# Patient Record
Sex: FEMALE | Race: WHITE | NOT HISPANIC OR LATINO
[De-identification: names, ages, dates, MRNs, and addresses within clinical notes are randomized per-mention and may not be internally consistent; named-entity substitution may affect disease eponyms.]

---

## 2019-01-17 ENCOUNTER — TRANSCRIPTION ENCOUNTER (OUTPATIENT)
Age: 69
End: 2019-01-17

## 2019-03-31 ENCOUNTER — TRANSCRIPTION ENCOUNTER (OUTPATIENT)
Age: 69
End: 2019-03-31

## 2021-02-03 PROBLEM — Z00.00 ENCOUNTER FOR PREVENTIVE HEALTH EXAMINATION: Status: ACTIVE | Noted: 2021-02-03

## 2021-04-02 ENCOUNTER — APPOINTMENT (OUTPATIENT)
Dept: BREAST CENTER | Facility: CLINIC | Age: 71
End: 2021-04-02
Payer: MEDICARE

## 2021-04-02 VITALS
DIASTOLIC BLOOD PRESSURE: 95 MMHG | SYSTOLIC BLOOD PRESSURE: 168 MMHG | HEIGHT: 63 IN | WEIGHT: 120 LBS | BODY MASS INDEX: 21.26 KG/M2 | HEART RATE: 64 BPM | OXYGEN SATURATION: 98 % | TEMPERATURE: 97.6 F

## 2021-04-02 DIAGNOSIS — Z78.9 OTHER SPECIFIED HEALTH STATUS: ICD-10-CM

## 2021-04-02 DIAGNOSIS — N64.52 NIPPLE DISCHARGE: ICD-10-CM

## 2021-04-02 DIAGNOSIS — Z80.0 FAMILY HISTORY OF MALIGNANT NEOPLASM OF DIGESTIVE ORGANS: ICD-10-CM

## 2021-04-02 PROCEDURE — 99204 OFFICE O/P NEW MOD 45 MIN: CPT

## 2021-04-02 NOTE — HISTORY OF PRESENT ILLNESS
[FreeTextEntry1] : I had the pleasure of seeing RADHA CHEN  in the office today for a new breast evaluation secondary to left breast breast lump and clear/yellow left nipple discharge.\par \par Radha is a yury 71 yo who undergoes annual mammogram and sonogram.  Most recent US from 2020 demonstrated stable left breast cysts and mass.  She underwent MR breast in 2020 due to manually expressed clear left nipple discharge.  MR breast demonstrate 5 mm enhancing in the right breast with recommendation of biopsy.  She was scheduled to undergo right breast MR biopsy but they were unable to visualize enhancing mass and therefore recommended short term follow up MR breast.  She went back to her breast surgeon Dr Martinez Kendrick, who recommended a excisional biopsy.  Patient is here for a second opinion.\par \par She denies dominant breast mass, skin changes. She denies headaches, blurry vision, chest pain, SOB, abdominal pain, joint aches or difficult walking.\par \par  with 1st delivery at 36.  Menses began at age 14.\par She denies personal history of malignancy.\par Family history is significant for father diagnosed with colon cancer.\par \par 20  MRI brest with and without IV contrast bilateral  \par Impression:  Right breast 0.5 cm enhancing mass at the 6:0 position.  MR guided biopsy is recommended.  BIRADS 4\par \par 2020  MRI guided breast biopsy with and without IV contrast right\par Impression:  Previously seen 5 mm mass in the right breast at 6:00 was not identified on today's prebiopsy images.  Therefor biopsy was not performed.  Recommend 6 moth follow up MRI.  \par 7/10/2020  Breast ultrasound left complete\par Impression:  Stable benign appearing nodules.  Recommend six month follow up left breast ultrasound.  BIRADS 3 probably benign.\par \par We reviewed clinical exam and recent imaging.  Clear nipple discharge is manually expressed from the left nipple today.  There are no dominant masses on exam today.  MR breast from 2020 demonstrated a 5 mm enhancing in the right breast with recommendation of biopsy.  She was scheduled to undergo right breast MR biopsy but they were unable to visualize enhancing mass and therefore recommended short term follow up MR breast.  She went back to her breast surgeon Dr Martinez Kendrick, who recommended a excisional biopsy.  Patient is here for a second opinion.  Recommendation for labs, MR breast, and follow up after MR breast.  Patient wants to delay MR breast due to COVID vaccine placed yesterday and will undergo MR the 1 st week of May.\par \par She understands and agrees with plan.  All questions answered.\par \par \par

## 2021-04-02 NOTE — ASSESSMENT
[FreeTextEntry1] : 71 yo presents with left nipple clear discharge and abnormal MR breast.  MR breast from February 2020 demonstrated a 5 mm enhancing in the right breast with recommendation of biopsy.  She was scheduled to undergo right breast MR biopsy but they were unable to visualize enhancing mass and therefore recommended short term follow up MR breast.  She went back to her breast surgeon Dr Martinez Kendrick, who recommended a excisional biopsy.  Patient is here for a second opinion.  Recommendation for labs, MR breast, and follow up after MR breast.  Patient wants to delay MR breast due to COVID vaccine placed yesterday and will undergo MR the 1 st week of May.\par 1.  MR breast the 1 st week of MAy\par 2.  Follow up after MR breast via TEB\par 3.  Screening mammogram/sonogram 10/2021

## 2021-04-02 NOTE — PHYSICAL EXAM
[Normocephalic] : normocephalic [Atraumatic] : atraumatic [Supple] : supple [No Supraclavicular Adenopathy] : no supraclavicular adenopathy [Examined in the supine and seated position] : examined in the supine and seated position [No dominant masses] : no dominant masses in right breast  [No dominant masses] : no dominant masses left breast [No Nipple Retraction] : no left nipple retraction [No Nipple Discharge] : no right nipple discharge [Breast Abnormal Secretion Serous Fluid Left] : serous discharge [No Axillary Lymphadenopathy] : no left axillary lymphadenopathy [No Edema] : no edema [No Rashes] : no rashes [No Ulceration] : no ulceration [de-identified] : No supraclavicular or axillary adenopathy. No dominant masses, normal to palpation. Everted nipple without discharge. No skin changes.\par \par  [de-identified] : No supraclavicular or axillary adenopathy. No dominant masses, normal to palpation. Everted nipple without discharge. No skin changes.\par \par

## 2021-05-10 ENCOUNTER — APPOINTMENT (OUTPATIENT)
Dept: MRI IMAGING | Facility: CLINIC | Age: 71
End: 2021-05-10
Payer: MEDICARE

## 2021-05-10 ENCOUNTER — OUTPATIENT (OUTPATIENT)
Dept: OUTPATIENT SERVICES | Facility: HOSPITAL | Age: 71
LOS: 1 days | End: 2021-05-10
Payer: MEDICARE

## 2021-05-10 DIAGNOSIS — R92.8 OTHER ABNORMAL AND INCONCLUSIVE FINDINGS ON DIAGNOSTIC IMAGING OF BREAST: ICD-10-CM

## 2021-05-10 PROCEDURE — 77049 MRI BREAST C-+ W/CAD BI: CPT | Mod: 26,MG

## 2021-05-10 PROCEDURE — G1004: CPT

## 2021-05-10 PROCEDURE — C8908: CPT

## 2021-05-10 PROCEDURE — A9585: CPT

## 2021-05-10 PROCEDURE — C8937: CPT

## 2021-05-25 ENCOUNTER — APPOINTMENT (OUTPATIENT)
Dept: MRI IMAGING | Facility: CLINIC | Age: 71
End: 2021-05-25
Payer: MEDICARE

## 2021-05-25 ENCOUNTER — RESULT REVIEW (OUTPATIENT)
Age: 71
End: 2021-05-25

## 2021-05-25 ENCOUNTER — OUTPATIENT (OUTPATIENT)
Dept: OUTPATIENT SERVICES | Facility: HOSPITAL | Age: 71
LOS: 1 days | End: 2021-05-25
Payer: MEDICARE

## 2021-05-25 DIAGNOSIS — R92.8 OTHER ABNORMAL AND INCONCLUSIVE FINDINGS ON DIAGNOSTIC IMAGING OF BREAST: ICD-10-CM

## 2021-05-25 PROCEDURE — 88305 TISSUE EXAM BY PATHOLOGIST: CPT

## 2021-05-25 PROCEDURE — A9585: CPT

## 2021-05-25 PROCEDURE — 77065 DX MAMMO INCL CAD UNI: CPT

## 2021-05-25 PROCEDURE — 77065 DX MAMMO INCL CAD UNI: CPT | Mod: 26,LT

## 2021-05-25 PROCEDURE — 88305 TISSUE EXAM BY PATHOLOGIST: CPT | Mod: 26

## 2021-05-25 PROCEDURE — 19085 BX BREAST 1ST LESION MR IMAG: CPT | Mod: LT

## 2021-05-25 PROCEDURE — 19085 BX BREAST 1ST LESION MR IMAG: CPT

## 2021-05-25 PROCEDURE — A4648: CPT

## 2021-05-28 ENCOUNTER — NON-APPOINTMENT (OUTPATIENT)
Age: 71
End: 2021-05-28

## 2021-06-17 ENCOUNTER — APPOINTMENT (OUTPATIENT)
Dept: SURGERY | Facility: CLINIC | Age: 71
End: 2021-06-17
Payer: MEDICARE

## 2021-06-17 VITALS
DIASTOLIC BLOOD PRESSURE: 76 MMHG | HEART RATE: 76 BPM | BODY MASS INDEX: 21.62 KG/M2 | SYSTOLIC BLOOD PRESSURE: 136 MMHG | WEIGHT: 122 LBS | TEMPERATURE: 98 F | OXYGEN SATURATION: 98 % | HEIGHT: 63 IN

## 2021-06-17 DIAGNOSIS — Z12.39 ENCOUNTER FOR OTHER SCREENING FOR MALIGNANT NEOPLASM OF BREAST: ICD-10-CM

## 2021-06-17 PROCEDURE — 99213 OFFICE O/P EST LOW 20 MIN: CPT

## 2021-06-17 NOTE — HISTORY OF PRESENT ILLNESS
[FreeTextEntry1] : I had the pleasure of seeing RADHA CHEN  in the office today to review biopsy results of the left breast demonstrating a intraductal papilloma. \par \par Radha is a yury 69 yo who undergoes annual mammogram and sonogram.  Most recent US from 2020 demonstrated stable left breast cysts and mass.  She underwent MR breast in 2020 due to manually expressed clear left nipple discharge.  MR breast demonstrate 5 mm enhancing in the right breast with recommendation of biopsy.  She was scheduled to undergo right breast MR biopsy but they were unable to visualize enhancing mass and therefore recommended short term follow up MR breast.  She went back to her breast surgeon Dr Martinez Kendrick, who recommended a excisional biopsy.  Patient is here for a second opinion.\par \par She underwent short term follow up MR breast on 5/10/2021 demonstrating a 1 cm enhancement with recommendation of biopsy.  Biopsy performed on 2021 demonstrated a intraductal papilloma. \par \par She denies dominant breast mass, skin changes. She denies headaches, blurry vision, chest pain, SOB, abdominal pain, joint aches or difficult walking.\par \par  with 1st delivery at 36.  Menses began at age 14.\par She denies personal history of malignancy.\par Family history is significant for father diagnosed with colon cancer.\par \par 20  MRI brest with and without IV contrast bilateral  \par Impression:  Right breast 0.5 cm enhancing mass at the 6:0 position.  MR guided biopsy is recommended.  BIRADS 4\par \par 2020  MRI guided breast biopsy with and without IV contrast right\par Impression:  Previously seen 5 mm mass in the right breast at 6:00 was not identified on today's prebiopsy images.  Therefor biopsy was not performed.  Recommend 6 moth follow up MRI.  \par 7/10/2020  Breast ultrasound left complete\par Impression:  Stable benign appearing nodules.  Recommend six month follow up left breast ultrasound.  BIRADS 3 probably benign.\par \par MR breast 5/10/2021\par A 1.0 cm linear nonmass enhancement in the left retroareolar breast. Given history of nipple discharge, left breast MRI guided biopsy is recommended.\par No dominant suspicious enhancement in the right breast at this time. Numerous bilateral foci/moderate background enhancement. Recommend follow-up MRI evaluation in 6 months.\par RECOMMENDATION: MR guided biopsy. BI-RADS 4A -Suspicious Finding(s) -Low Suspicion for Malignancy\par \par We reviewed the findings on imaging and pathology.  Radha understands that intraductal papilloma is benign.  We discussed options of surgical localized excisional biopsy v continued observation. She understands that there is no evidence of atypia and that she may continue observation with clinical examination twice a year/annual screening mammogram + ultrasound.\par \par Radha understands that intraductal papilloma is benign however in 10-20% of cases abnormal or cancer cells is found at excisional biopsy.  We discussed the higher risk associated with atypia or size and she understands that based on the imaging and pathology, the findings on pathology are at lower risk for upstaging.\par Technical aspects of biopsy were discussed including UNRULY  and wire localization.  We also discussed the chance that the clip is not in the specimen and in that case, recommendation for 3-6 month follow up diagnostic mammogram.\par \par She understands and wants to proceed with clinical observation\par \par All questions were answered.

## 2021-06-17 NOTE — ASSESSMENT
[FreeTextEntry1] : 71 yo presented to the office with left nipple discharge and MR biopsy proven left breast intraductal papilloma ( 1 cm linear enhancement).    Plan for clinical observation.  She does not want to undergo surgery at this time.\par 1.  MR breast - short term follow up for numerous bilateral/moderated background enhancement 11/2021\par 2.  Mammogram/sonogram in 3 months\par 3.  Left breast tasha  excisional biopsy\par 4.  Follow up in 3 months

## 2021-06-17 NOTE — PHYSICAL EXAM
[Normocephalic] : normocephalic [Atraumatic] : atraumatic [Supple] : supple [No Supraclavicular Adenopathy] : no supraclavicular adenopathy [Examined in the supine and seated position] : examined in the supine and seated position [No dominant masses] : no dominant masses in right breast  [No dominant masses] : no dominant masses left breast [No Nipple Retraction] : no left nipple retraction [No Nipple Discharge] : no right nipple discharge [Breast Abnormal Secretion Serous Fluid Left] : serous discharge [No Axillary Lymphadenopathy] : no left axillary lymphadenopathy [No Edema] : no edema [No Rashes] : no rashes [No Ulceration] : no ulceration [de-identified] : No supraclavicular or axillary adenopathy. No dominant masses, normal to palpation. Everted nipple without discharge. No skin changes.\par \par  [de-identified] : No supraclavicular or axillary adenopathy. No dominant masses, normal to palpation. Everted nipple without discharge. No skin changes.\par \par

## 2021-09-20 ENCOUNTER — APPOINTMENT (OUTPATIENT)
Dept: ULTRASOUND IMAGING | Facility: CLINIC | Age: 71
End: 2021-09-20

## 2021-09-20 ENCOUNTER — APPOINTMENT (OUTPATIENT)
Dept: MAMMOGRAPHY | Facility: CLINIC | Age: 71
End: 2021-09-20

## 2021-09-23 ENCOUNTER — OUTPATIENT (OUTPATIENT)
Dept: OUTPATIENT SERVICES | Facility: HOSPITAL | Age: 71
LOS: 1 days | End: 2021-09-23
Payer: MEDICARE

## 2021-09-23 ENCOUNTER — APPOINTMENT (OUTPATIENT)
Dept: MAMMOGRAPHY | Facility: CLINIC | Age: 71
End: 2021-09-23
Payer: MEDICARE

## 2021-09-23 ENCOUNTER — RESULT REVIEW (OUTPATIENT)
Age: 71
End: 2021-09-23

## 2021-09-23 ENCOUNTER — APPOINTMENT (OUTPATIENT)
Dept: ULTRASOUND IMAGING | Facility: CLINIC | Age: 71
End: 2021-09-23
Payer: MEDICARE

## 2021-09-23 DIAGNOSIS — D24.2 BENIGN NEOPLASM OF LEFT BREAST: ICD-10-CM

## 2021-09-23 PROCEDURE — 76641 ULTRASOUND BREAST COMPLETE: CPT | Mod: 26,50

## 2021-09-23 PROCEDURE — 77066 DX MAMMO INCL CAD BI: CPT

## 2021-09-23 PROCEDURE — G0279: CPT | Mod: 26

## 2021-09-23 PROCEDURE — 77066 DX MAMMO INCL CAD BI: CPT | Mod: 26

## 2021-09-23 PROCEDURE — 76641 ULTRASOUND BREAST COMPLETE: CPT

## 2021-09-23 PROCEDURE — G0279: CPT

## 2021-09-30 ENCOUNTER — APPOINTMENT (OUTPATIENT)
Dept: SURGERY | Facility: CLINIC | Age: 71
End: 2021-09-30

## 2021-11-17 ENCOUNTER — APPOINTMENT (OUTPATIENT)
Dept: MRI IMAGING | Facility: CLINIC | Age: 71
End: 2021-11-17
Payer: MEDICARE

## 2021-11-17 ENCOUNTER — OUTPATIENT (OUTPATIENT)
Dept: OUTPATIENT SERVICES | Facility: HOSPITAL | Age: 71
LOS: 1 days | End: 2021-11-17
Payer: MEDICARE

## 2021-11-17 DIAGNOSIS — R92.8 OTHER ABNORMAL AND INCONCLUSIVE FINDINGS ON DIAGNOSTIC IMAGING OF BREAST: ICD-10-CM

## 2021-11-17 PROCEDURE — G1004: CPT

## 2021-11-17 PROCEDURE — C8908: CPT | Mod: MG

## 2021-11-17 PROCEDURE — C8937: CPT

## 2021-11-17 PROCEDURE — A9585: CPT

## 2021-11-17 PROCEDURE — 77049 MRI BREAST C-+ W/CAD BI: CPT | Mod: 26,MG

## 2022-03-28 ENCOUNTER — RESULT REVIEW (OUTPATIENT)
Age: 72
End: 2022-03-28

## 2022-03-28 ENCOUNTER — OUTPATIENT (OUTPATIENT)
Dept: OUTPATIENT SERVICES | Facility: HOSPITAL | Age: 72
LOS: 1 days | End: 2022-03-28

## 2022-03-28 ENCOUNTER — APPOINTMENT (OUTPATIENT)
Dept: ULTRASOUND IMAGING | Facility: CLINIC | Age: 72
End: 2022-03-28

## 2022-03-28 DIAGNOSIS — Z00.00 ENCOUNTER FOR GENERAL ADULT MEDICAL EXAMINATION WITHOUT ABNORMAL FINDINGS: ICD-10-CM

## 2022-03-31 ENCOUNTER — APPOINTMENT (OUTPATIENT)
Dept: SURGERY | Facility: CLINIC | Age: 72
End: 2022-03-31

## 2022-04-12 ENCOUNTER — APPOINTMENT (OUTPATIENT)
Dept: ULTRASOUND IMAGING | Facility: CLINIC | Age: 72
End: 2022-04-12
Payer: MEDICARE

## 2022-04-12 ENCOUNTER — RESULT REVIEW (OUTPATIENT)
Age: 72
End: 2022-04-12

## 2022-04-12 ENCOUNTER — OUTPATIENT (OUTPATIENT)
Dept: OUTPATIENT SERVICES | Facility: HOSPITAL | Age: 72
LOS: 1 days | End: 2022-04-12
Payer: MEDICARE

## 2022-04-12 DIAGNOSIS — R92.8 OTHER ABNORMAL AND INCONCLUSIVE FINDINGS ON DIAGNOSTIC IMAGING OF BREAST: ICD-10-CM

## 2022-04-12 PROCEDURE — 76642 ULTRASOUND BREAST LIMITED: CPT

## 2022-04-12 PROCEDURE — 76642 ULTRASOUND BREAST LIMITED: CPT | Mod: 26,LT

## 2022-04-19 ENCOUNTER — APPOINTMENT (OUTPATIENT)
Dept: BREAST CENTER | Facility: CLINIC | Age: 72
End: 2022-04-19
Payer: MEDICARE

## 2022-04-19 VITALS
BODY MASS INDEX: 21.27 KG/M2 | TEMPERATURE: 98.1 F | DIASTOLIC BLOOD PRESSURE: 92 MMHG | WEIGHT: 120.01 LBS | HEIGHT: 63 IN | OXYGEN SATURATION: 98 % | SYSTOLIC BLOOD PRESSURE: 148 MMHG | HEART RATE: 75 BPM

## 2022-04-19 DIAGNOSIS — D24.2 BENIGN NEOPLASM OF LEFT BREAST: ICD-10-CM

## 2022-04-19 DIAGNOSIS — R92.2 INCONCLUSIVE MAMMOGRAM: ICD-10-CM

## 2022-04-19 DIAGNOSIS — R92.8 OTHER ABNORMAL AND INCONCLUSIVE FINDINGS ON DIAGNOSTIC IMAGING OF BREAST: ICD-10-CM

## 2022-04-19 PROCEDURE — 99214 OFFICE O/P EST MOD 30 MIN: CPT

## 2022-04-19 NOTE — PHYSICAL EXAM
[Normocephalic] : normocephalic [Atraumatic] : atraumatic [Supple] : supple [No Supraclavicular Adenopathy] : no supraclavicular adenopathy [Examined in the supine and seated position] : examined in the supine and seated position [No dominant masses] : no dominant masses in right breast  [No dominant masses] : no dominant masses left breast [No Nipple Retraction] : no left nipple retraction [No Nipple Discharge] : no right nipple discharge [Breast Abnormal Secretion Serous Fluid Left] : serous discharge [No Axillary Lymphadenopathy] : no left axillary lymphadenopathy [No Edema] : no edema [No Rashes] : no rashes [No Ulceration] : no ulceration [de-identified] : No supraclavicular or axillary adenopathy. No dominant masses, normal to palpation. Everted nipple without discharge. No skin changes.\par \par  [de-identified] : No supraclavicular or axillary adenopathy. No dominant masses, normal to palpation. Everted nipple without discharge. No skin changes.\par \par

## 2022-04-19 NOTE — HISTORY OF PRESENT ILLNESS
[FreeTextEntry1] : I had the pleasure of seeing RADHA CHEN  in the office today for a follow up exam.\par \par Radha is a yury 71 yo who undergoes annual mammogram and sonogram.  Most recent US from 2020 demonstrated stable left breast cysts and mass.  She underwent MR breast in 2020 due to manually expressed clear left nipple discharge.  MR breast demonstrate 5 mm enhancing in the right breast with recommendation of biopsy.  She was scheduled to undergo right breast MR biopsy but they were unable to visualize enhancing mass and therefore recommended short term follow up MR breast.  She went back to her breast surgeon Dr Martinez Kendrick, who recommended a excisional biopsy.  Patient is here for a second opinion.\par \par She underwent short term follow up MR breast on 5/10/2021 demonstrating a 1 cm enhancement with recommendation of biopsy.  Biopsy performed on 2021 demonstrated a intraductal papilloma. Short term imaging has demonstrated stability of papilloma.\par \par She denies dominant breast mass, skin changes. She denies headaches, blurry vision, chest pain, SOB, abdominal pain, joint aches or difficult walking.\par \par  with 1st delivery at 36.  Menses began at age 14.\par She denies personal history of malignancy.\par Family history is significant for father diagnosed with colon cancer.\par \par 20  MRI brest with and without IV contrast bilateral  \par Impression:  Right breast 0.5 cm enhancing mass at the 6:0 position.  MR guided biopsy is recommended.  BIRADS 4\par \par 2020  MRI guided breast biopsy with and without IV contrast right\par Impression:  Previously seen 5 mm mass in the right breast at 6:00 was not identified on today's prebiopsy images.  Therefor biopsy was not performed.  Recommend 6 moth follow up MRI.  \par 7/10/2020  Breast ultrasound left complete\par Impression:  Stable benign appearing nodules.  Recommend six month follow up left breast ultrasound.  BIRADS 3 probably benign.\par \par MR breast 5/10/2021\par A 1.0 cm linear nonmass enhancement in the left retroareolar breast. Given history of nipple discharge, left breast MRI guided biopsy is recommended.\par No dominant suspicious enhancement in the right breast at this time. Numerous bilateral foci/moderate background enhancement. Recommend follow-up MRI evaluation in 6 months.\par RECOMMENDATION: MR guided biopsy. BI-RADS 4A -Suspicious Finding(s) -Low Suspicion for Malignancy\par \par 2021  MR breast\par 1. Minimal 5 mm residual enhancement at the site of prior MR biopsy in the slightly inner retroareolar left breast revealing a papilloma with a biopsy marker about 2 cm laterally displaced from the biopsy site. Surgical consultation/surgical management as warranted is recommended.\par 2. Stable bilateral numerous foci for which continued six-month follow-up breast MRI recommended to ascertain full one-year stability.\par 3. Of note, a six-month follow-up left breast ultrasound was recommended for additional probable benign findings per 2021 ultrasound report.\par RECOMMENDATION: MRI in 6 months. BI-RADS 3-Probably Benign Finding(s) \par \par 2022  US breast Limited left\par Impression:  No change in left breast sonographic finding.\par RECOMMENDATION: Resume Annual Mammography on Schedule. Unless otherwise clinically indicated, the patient will be due for annual bilateral mammogram in 2022.  BI-RADS 2 - Benign Finding(s)\par \par We reviewed clinical breast exam and recent short term follow up imaging.  Both clinical exam and short term imaging is benign.  She will be due for MR breast in May 2022 to follow up stable bilateral numerous foci for which continued six-month follow-up breast MRI recommended to ascertain full one-year stability.  If MR breast is benign the follow up in October after screening mammogram and sonogram.\par \par All questions were answered.

## 2022-04-19 NOTE — ASSESSMENT
[FreeTextEntry1] : 70 yo presented to the office with left nipple discharge and MR biopsy proven left breast intraductal papilloma ( 1 cm linear enhancement).    Plan for clinical observation.  Short term follow up imaging demonstrated stability of papilloma.  Recommendation for:\par 1.  MR breast - short term follow up for numerous bilateral/moderated background enhancement 5/2022\par 2.  Mammogram/sonogram screening 9/2022\par 3. Follow up in 6 months

## 2022-04-20 PROBLEM — R92.2 DENSE BREAST: Status: ACTIVE | Noted: 2022-04-20

## 2022-08-23 ENCOUNTER — APPOINTMENT (OUTPATIENT)
Dept: MRI IMAGING | Facility: CLINIC | Age: 72
End: 2022-08-23

## 2022-08-23 ENCOUNTER — OUTPATIENT (OUTPATIENT)
Dept: OUTPATIENT SERVICES | Facility: HOSPITAL | Age: 72
LOS: 1 days | End: 2022-08-23
Payer: MEDICARE

## 2022-08-23 DIAGNOSIS — R92.8 OTHER ABNORMAL AND INCONCLUSIVE FINDINGS ON DIAGNOSTIC IMAGING OF BREAST: ICD-10-CM

## 2022-08-23 PROCEDURE — C8937: CPT

## 2022-08-23 PROCEDURE — G1004: CPT

## 2022-08-23 PROCEDURE — 77049 MRI BREAST C-+ W/CAD BI: CPT | Mod: 26,MG

## 2022-08-23 PROCEDURE — C8908: CPT | Mod: MG

## 2022-08-23 PROCEDURE — A9585: CPT

## 2022-10-14 ENCOUNTER — RESULT REVIEW (OUTPATIENT)
Age: 72
End: 2022-10-14

## 2022-10-14 ENCOUNTER — APPOINTMENT (OUTPATIENT)
Dept: ULTRASOUND IMAGING | Facility: CLINIC | Age: 72
End: 2022-10-14

## 2022-10-14 ENCOUNTER — APPOINTMENT (OUTPATIENT)
Dept: MAMMOGRAPHY | Facility: CLINIC | Age: 72
End: 2022-10-14

## 2022-10-14 ENCOUNTER — OUTPATIENT (OUTPATIENT)
Dept: OUTPATIENT SERVICES | Facility: HOSPITAL | Age: 72
LOS: 1 days | End: 2022-10-14
Payer: MEDICARE

## 2022-10-14 DIAGNOSIS — R92.8 OTHER ABNORMAL AND INCONCLUSIVE FINDINGS ON DIAGNOSTIC IMAGING OF BREAST: ICD-10-CM

## 2022-10-14 PROCEDURE — 76641 ULTRASOUND BREAST COMPLETE: CPT | Mod: 26,50

## 2022-10-14 PROCEDURE — 77067 SCR MAMMO BI INCL CAD: CPT | Mod: 26

## 2022-10-14 PROCEDURE — 77063 BREAST TOMOSYNTHESIS BI: CPT | Mod: 26

## 2022-10-14 PROCEDURE — 76641 ULTRASOUND BREAST COMPLETE: CPT

## 2022-10-14 PROCEDURE — 77067 SCR MAMMO BI INCL CAD: CPT

## 2022-10-14 PROCEDURE — 77063 BREAST TOMOSYNTHESIS BI: CPT

## 2023-11-21 ENCOUNTER — APPOINTMENT (OUTPATIENT)
Dept: MAMMOGRAPHY | Facility: CLINIC | Age: 73
End: 2023-11-21

## 2024-08-26 ENCOUNTER — NON-APPOINTMENT (OUTPATIENT)
Age: 74
End: 2024-08-26

## 2024-08-27 ENCOUNTER — NON-APPOINTMENT (OUTPATIENT)
Age: 74
End: 2024-08-27

## 2024-08-27 ENCOUNTER — APPOINTMENT (OUTPATIENT)
Dept: OBGYN | Facility: CLINIC | Age: 74
End: 2024-08-27
Payer: MEDICARE

## 2024-08-27 VITALS
DIASTOLIC BLOOD PRESSURE: 84 MMHG | WEIGHT: 120 LBS | SYSTOLIC BLOOD PRESSURE: 177 MMHG | HEIGHT: 63 IN | BODY MASS INDEX: 21.26 KG/M2

## 2024-08-27 DIAGNOSIS — N95.0 POSTMENOPAUSAL BLEEDING: ICD-10-CM

## 2024-08-27 PROCEDURE — 99203 OFFICE O/P NEW LOW 30 MIN: CPT

## 2024-08-27 NOTE — HISTORY OF PRESENT ILLNESS
[FreeTextEntry1] : 73 yo  she states that 2-3 months ago she states that she had one episode of vag bleeding , she than had some bleeding for 2 days about 2-3 weeksa ago.

## 2024-08-27 NOTE — DISCUSSION/SUMMARY
[FreeTextEntry1] : sent pt to go have a vag sono  disc she had bleeding more than once will need a bx , will see if there is a polyp disc d and c and polypectomy if that is the case.

## 2024-08-27 NOTE — PHYSICAL EXAM
[Chaperone Declined] : Patient declined chaperone [Appropriately responsive] : appropriately responsive [Alert] : alert [No Acute Distress] : no acute distress [Oriented x3] : oriented x3 [Vulvar Atrophy] : vulvar atrophy [Labia Majora] : normal [Labia Minora] : normal [Atrophy] : atrophy [Normal] : normal [Uterine Adnexae] : normal [FreeTextEntry5] : small os

## 2024-10-01 RX ORDER — MISOPROSTOL 200 UG/1
200 TABLET ORAL
Qty: 2 | Refills: 0 | Status: ACTIVE | COMMUNITY
Start: 2024-10-01 | End: 1900-01-01

## 2024-10-09 ENCOUNTER — APPOINTMENT (OUTPATIENT)
Dept: OBGYN | Facility: CLINIC | Age: 74
End: 2024-10-09
Payer: MEDICARE

## 2024-10-09 VITALS
BODY MASS INDEX: 20.91 KG/M2 | WEIGHT: 118 LBS | SYSTOLIC BLOOD PRESSURE: 158 MMHG | DIASTOLIC BLOOD PRESSURE: 92 MMHG | HEIGHT: 63 IN

## 2024-10-09 DIAGNOSIS — N95.0 POSTMENOPAUSAL BLEEDING: ICD-10-CM

## 2024-10-09 PROCEDURE — 99204 OFFICE O/P NEW MOD 45 MIN: CPT
